# Patient Record
Sex: MALE | Race: WHITE | Employment: FULL TIME | URBAN - METROPOLITAN AREA
[De-identification: names, ages, dates, MRNs, and addresses within clinical notes are randomized per-mention and may not be internally consistent; named-entity substitution may affect disease eponyms.]

---

## 2024-04-25 ENCOUNTER — OFFICE VISIT (OUTPATIENT)
Dept: PRIMARY CARE | Facility: CLINIC | Age: 49
End: 2024-04-25
Payer: COMMERCIAL

## 2024-04-25 DIAGNOSIS — E55.9 VITAMIN D DEFICIENCY: ICD-10-CM

## 2024-04-25 DIAGNOSIS — M62.89 MUSCLE FATIGUE: ICD-10-CM

## 2024-04-25 DIAGNOSIS — Z00.00 HEALTHCARE MAINTENANCE: Primary | ICD-10-CM

## 2024-04-25 DIAGNOSIS — E53.8 VITAMIN B12 DEFICIENCY: ICD-10-CM

## 2024-04-25 DIAGNOSIS — Z12.5 PROSTATE CANCER SCREENING: ICD-10-CM

## 2024-04-25 PROCEDURE — 99396 PREV VISIT EST AGE 40-64: CPT | Performed by: STUDENT IN AN ORGANIZED HEALTH CARE EDUCATION/TRAINING PROGRAM

## 2024-04-25 PROCEDURE — 99213 OFFICE O/P EST LOW 20 MIN: CPT | Performed by: STUDENT IN AN ORGANIZED HEALTH CARE EDUCATION/TRAINING PROGRAM

## 2024-04-25 NOTE — PROGRESS NOTES
Subjective   Sony Ladd is a 48 y.o. male who is here for a routine exam.  Active Problem List      Comprehensive Medical/Surgical/Social/Family History  History reviewed. No pertinent past medical history.  Past Surgical History:   Procedure Laterality Date    OTHER SURGICAL HISTORY  12/14/2020    Appendectomy    OTHER SURGICAL HISTORY  12/14/2020    Hernia repair    OTHER SURGICAL HISTORY  12/14/2020    Tonsillectomy    OTHER SURGICAL HISTORY  12/14/2020    Sinus surgery    OTHER SURGICAL HISTORY  12/14/2020    [place] tooth extraction     Social History     Social History Narrative    Not on file       Allergies and Medications  Patient has no allergy information on record.  No current outpatient medications on file prior to visit.     No current facility-administered medications on file prior to visit.       Concerns today:  Patient seen today for exercise fatigue.  He is very physically active, feels he cannot do as much as he previously could.  Exercises regularly patient was in the  previously diagnosed PTSD.  Was previously on Zoloft but caused restless leg syndrome.  States he regularly got restless leg syndrome with medications not like to take too many medications.  Does take vitamin supplementation regularly.  Is interested in getting lab work today concern for testosterone levels.  Has decreased sexual interest but no history of with sexual activity.  Currently single living by himself.  Patient sleeps 3 to 5 hours per night states he had previously slept better, all would be interested in getting  Help with sleep as well.      Lifestyle  Diet: Healthy and Well Balanced  Physical Activity: Not on file      Tobacco Use: Low Risk  (10/4/2023)    Received from [facility]    Patient History     Smoking Tobacco Use: Never     Smokeless Tobacco Use: Never     Passive Exposure: Not on file      Alcohol Use: Not on file      Depression: Not at risk (4/18/2024)    Received from [facility]     PHQ-2     PHQ-2 score: 0      Stress: Not on file       Consultants:  None        Did not this because colorectal screening at this time    Nocturia: .Absent  Erectile dysfunction: .Absent    Review of Systems    Objective   There were no vitals taken for this visit.    Physical Exam    Assessment/Plan   Problem List Items Addressed This Visit    None  Visit Diagnoses       Healthcare maintenance    -  Primary    Relevant Orders    Comprehensive Metabolic Panel    Lipid Panel    CBC    Vitamin B12 deficiency        Relevant Orders    Vitamin B12    Vitamin D deficiency        Relevant Orders    Vitamin D 25-Hydroxy,Total (for eval of Vitamin D levels)    Muscle fatigue        Relevant Orders    TSH with reflex to Free T4 if abnormal    Testosterone    Prostate cancer screening        Relevant Orders    Prostate Spec.Ag,Screen          Reviewed Social Determinants of health with patient, discussed healthy lifestyle including 150 minutes of physical activity per week  Ordered/Reviewed baseline labwork -CBC, CMP, Lipid Panel  Will get routine lab work, will follow-up with results.    For patient's sleep discussed trying a magnesium glycinate supplementation.    If that does not work we will trial trazodone.    Will check lab work including TSH, testosterone, vitamin D, vitamin B12 as well.

## 2024-07-01 ENCOUNTER — HOSPITAL ENCOUNTER (OUTPATIENT)
Dept: RADIOLOGY | Facility: CLINIC | Age: 49
Discharge: HOME | End: 2024-07-01
Payer: COMMERCIAL

## 2024-07-01 ENCOUNTER — OFFICE VISIT (OUTPATIENT)
Dept: PRIMARY CARE | Facility: CLINIC | Age: 49
End: 2024-07-01
Payer: COMMERCIAL

## 2024-07-01 VITALS
RESPIRATION RATE: 20 BRPM | SYSTOLIC BLOOD PRESSURE: 133 MMHG | OXYGEN SATURATION: 98 % | DIASTOLIC BLOOD PRESSURE: 78 MMHG | HEART RATE: 95 BPM | BODY MASS INDEX: 29.05 KG/M2 | TEMPERATURE: 98 F | WEIGHT: 180 LBS

## 2024-07-01 DIAGNOSIS — M54.50 LUMBAR BACK PAIN: ICD-10-CM

## 2024-07-01 DIAGNOSIS — M54.16 LUMBAR RADICULOPATHY: ICD-10-CM

## 2024-07-01 DIAGNOSIS — M54.50 LUMBAR BACK PAIN: Primary | ICD-10-CM

## 2024-07-01 PROCEDURE — 72100 X-RAY EXAM L-S SPINE 2/3 VWS: CPT

## 2024-07-01 PROCEDURE — 99214 OFFICE O/P EST MOD 30 MIN: CPT

## 2024-07-01 PROCEDURE — 1036F TOBACCO NON-USER: CPT

## 2024-07-01 PROCEDURE — 72100 X-RAY EXAM L-S SPINE 2/3 VWS: CPT | Performed by: RADIOLOGY

## 2024-07-01 RX ORDER — CYCLOBENZAPRINE HCL 10 MG
10 TABLET ORAL NIGHTLY PRN
Qty: 30 TABLET | Refills: 2 | Status: SHIPPED | OUTPATIENT
Start: 2024-07-01 | End: 2025-02-26

## 2024-07-01 RX ORDER — PREDNISONE 20 MG/1
40 TABLET ORAL DAILY
Qty: 10 TABLET | Refills: 0 | Status: SHIPPED | OUTPATIENT
Start: 2024-07-01 | End: 2024-07-06

## 2024-07-01 ASSESSMENT — ENCOUNTER SYMPTOMS
BACK PAIN: 1
ACTIVITY CHANGE: 0
APPETITE CHANGE: 0
FEVER: 0
CHILLS: 0

## 2024-07-01 NOTE — PROGRESS NOTES
I reviewed the resident/fellow's documentation and discussed the patient with the resident. I agree with the resident medical decision making as documented in the note.   Patient denies any right-sided symptoms to the resident such as saddle anesthesia, no bowel bladder dysfunction no numbness tingling, no weakness.  She is very tight tender.  He seen orthopedics in the past.  Will try medication stretches exercises.  Will recheck his x-ray.  Patient may need an MRI, referral to back specialist  If any chest pain, shortness of breath, any numbness tingling bowel bladder dysfunction any saddle anesthesia any weakness or worsening symptoms go to ER  Follow-up in 2 weeks  Side effects of medication were explained to the patient by the resident.  He has tolerated these in the past  Bryan David, DO

## 2024-07-01 NOTE — PROGRESS NOTES
"Subjective   Patient ID: [id number]   Sony Ladd is a 48 y.o. male who presents for Back Pain (Lower back pain after weeding yesterday. Pain is shooting down his left leg. ).  Patient states that he experienced left-sided lumbar back pain yesterday evening after weeding.  He denies any specific injury.  He describes pain as 2/10 at rest, with radiation down the left leg to the level of the knee.  He has tried ice/heat and Aleve with minimal improvement.  He states that he got very little sleep last night secondary to pain.  Pt denies any red flag symptoms such as fever, saddle anesthesia, loss of bowel/bladder control.  He describes previous imaging that revealed a \"vertebral fracture\" last year.           Review of Systems   Constitutional:  Negative for activity change, appetite change, chills and fever.   Musculoskeletal:  Positive for back pain.       Objective   /78 (BP Location: Right arm, Patient Position: Sitting)   Pulse 95   Temp 36.7 °C (98 °F)   Resp 20   Wt 81.6 kg (180 lb)   SpO2 98%   BMI 29.05 kg/m²    Physical Exam  Vitals and nursing note reviewed.   Constitutional:       General: He is not in acute distress.     Appearance: Normal appearance. He is not ill-appearing or toxic-appearing.   HENT:      Head: Normocephalic and atraumatic.      Right Ear: External ear normal.      Left Ear: External ear normal.      Nose: Nose normal.      Mouth/Throat:      Mouth: Mucous membranes are moist.   Eyes:      Extraocular Movements: Extraocular movements intact.   Cardiovascular:      Rate and Rhythm: Normal rate and regular rhythm.   Pulmonary:      Effort: Pulmonary effort is normal. No respiratory distress.      Breath sounds: Normal breath sounds. No stridor. No wheezing or rhonchi.   Abdominal:      General: Abdomen is flat.      Tenderness: There is no abdominal tenderness. There is no guarding or rebound.   Musculoskeletal:         General: Normal range of motion.        " Arms:       Cervical back: Normal.      Thoracic back: Normal.      Comments: Tenderness on midline at L5/S1  Spasm left paraspinal lumbar muscles  Positive left straight leg raise, reverse straight leg raise  Negative slump test   Skin:     General: Skin is warm and dry.      Findings: No rash.   Neurological:      General: No focal deficit present.      Mental Status: He is alert and oriented to person, place, and time.         Assessment/Plan   Problem List Items Addressed This Visit    None  Visit Diagnoses       Lumbar back pain    -  Primary    Relevant Medications    cyclobenzaprine (Flexeril) 10 mg tablet    predniSONE (Deltasone) 20 mg tablet    Other Relevant Orders    XR lumbar spine 2-3 views    Lumbar radiculopathy        Relevant Medications    cyclobenzaprine (Flexeril) 10 mg tablet    predniSONE (Deltasone) 20 mg tablet    Other Relevant Orders    XR lumbar spine 2-3 views             X-rays from 2022 at [facility] reveal bilateral pars defects at L5 with grade 1   anterolisthesis of L5-S1.  Will check lumbar x-rays to see if this is stable.  Otherwise plan as above, [place] back protocol provided.  Follow-up in 5 to 7 days if not improving.     Discussed with attending physician Dr. David.       Bhavin Nunez DO

## 2024-07-03 NOTE — RESULT ENCOUNTER NOTE
Patient's x-ray shows moderate anterolisthesis.  We should send him to orthopedics and physical therapy.  They did not mention the actual measurement

## 2024-07-08 ENCOUNTER — TELEPHONE (OUTPATIENT)
Dept: PRIMARY CARE | Facility: CLINIC | Age: 49
End: 2024-07-08
Payer: COMMERCIAL

## 2024-07-08 DIAGNOSIS — M43.16 ANTEROLISTHESIS OF LUMBAR SPINE: Primary | ICD-10-CM

## 2024-07-11 ENCOUNTER — OFFICE VISIT (OUTPATIENT)
Dept: NEUROSURGERY | Facility: CLINIC | Age: 49
End: 2024-07-11
Payer: COMMERCIAL

## 2024-07-11 VITALS
WEIGHT: 175.8 LBS | BODY MASS INDEX: 28.25 KG/M2 | TEMPERATURE: 97.7 F | HEART RATE: 100 BPM | HEIGHT: 66 IN | SYSTOLIC BLOOD PRESSURE: 128 MMHG | DIASTOLIC BLOOD PRESSURE: 92 MMHG

## 2024-07-11 DIAGNOSIS — M43.06 LUMBAR SPONDYLOLYSIS: ICD-10-CM

## 2024-07-11 DIAGNOSIS — M54.16 LUMBAR RADICULOPATHY: Primary | ICD-10-CM

## 2024-07-11 DIAGNOSIS — M43.17 ANTEROLISTHESIS OF LUMBOSACRAL SPINE: ICD-10-CM

## 2024-07-11 PROCEDURE — 1036F TOBACCO NON-USER: CPT | Performed by: NURSE PRACTITIONER

## 2024-07-11 PROCEDURE — 99203 OFFICE O/P NEW LOW 30 MIN: CPT | Performed by: NURSE PRACTITIONER

## 2024-07-11 RX ORDER — GABAPENTIN 100 MG/1
CAPSULE ORAL
Qty: 90 CAPSULE | Refills: 0 | Status: SHIPPED | OUTPATIENT
Start: 2024-07-11

## 2024-07-11 RX ORDER — NAPROXEN 250 MG/1
250 TABLET ORAL
COMMUNITY

## 2024-07-11 ASSESSMENT — PATIENT HEALTH QUESTIONNAIRE - PHQ9
1. LITTLE INTEREST OR PLEASURE IN DOING THINGS: NOT AT ALL
SUM OF ALL RESPONSES TO PHQ9 QUESTIONS 1 AND 2: 0
2. FEELING DOWN, DEPRESSED OR HOPELESS: NOT AT ALL

## 2024-07-11 ASSESSMENT — ENCOUNTER SYMPTOMS: OCCASIONAL FEELINGS OF UNSTEADINESS: 0

## 2024-07-11 ASSESSMENT — PAIN SCALES - GENERAL: PAINLEVEL: 6

## 2024-07-11 NOTE — PROGRESS NOTES
"It was a pleasure to see Sony Ladd on 7/11/2024. He is a 48 y.o. year old, right-handed male who presents to the [facility] for evaluation of back pain, lumbar radiculopathy. Patient is referred by No ref. provider found. PMH is significant for migraines, PTSD, renal stones, chronic sinusitis, known pars defect over the past 6 months. [place] and is often up on ladders.     Sony Ladd has had symptoms of back pain for many years. Current episode (points to low back radiating into left lateral and anterior thigh that he describes as painful, burning) has been present x 2 weeks since weeding in his garden. Progression of symptoms prompted lumbar spine x-ray which demonstrated L5 - S1 pars defect / spondylolysis with anterolisthesis.  Thus far, patient has tried activity adjustment,  with little to no improvement of symptoms. He denies change in bowel / bladder function, saddle anesthesia.     PREVIOUS TREATMENTS  NSAIDs  Muscle relaxant   Topical    Previous Spine Surgery: No     Smoker: No   Anticoagulation / Antiplatelets: YES: NSAID use     ROS: 12 / 12 systems reviewed and are negative unless noted in HPI    BP (!) 128/92   Pulse 100   Temp 36.5 °C (97.7 °F) (Temporal)   Ht 1.676 m (5' 6\")   Wt 79.7 kg (175 lb 12.8 oz)   BMI 28.37 kg/m²     ON EXAM:  General: Well developed male, awake/alert/oriented x 3, no distress, alert and cooperative  Skin: Warm and dry, no visible lesions / rashes  ENMT: Mucous membranes moist, no apparent injury  Head/Neck: No apparent injury  Respiratory/Thorax: Normal breathing with good chest expansion, thorax symmetric  Cardiovascular: No pitting edema, no JVD  Gastrointestinal: Non-distended  NEUROLOGICAL EXAM:  EOMI, face symmetric  Motor Strength: 5/5 in BLE  Range of Motion:   Normal in all extremities  Muscle Tone: Normal without spasticity or contractures in all extremities  Muscle Bulk: Normal and " symmetric in all extremities  Posture: Stands  in flexed forward position  -- Cervical: Normal  -- Thoracic: Normal  -- Lumbar : Normal  Sensation: DECREASED SENSORY L2 - 3 DERMATOMES, otherwise, SILT in BLE  Gait: Normal, toe / heel gait are slow and exacerbate dermatomal symptoms  Deep Tendon Reflexes: 2+ RLE, 1+ LLE      Sony Ladd has left lumbar radiculopathy and L5 - S1 anterolisthesis, spondylolysis. We reviewed lumbar AP/LAT images done 07/08/2024, and discussed rationale for dynamic lumbar imaging in the setting of spondylolysis / spondylolisthesis, for Physical Therapy for Home Exercise Program. Encouraged follow up in 6 - 8 weeks for reassessment. If not improved, will consider MRI L Spine.  He verbalizes understanding and agreement with plan.  Kate Pepe, HERI-CNP

## 2024-07-23 ENCOUNTER — LAB (OUTPATIENT)
Dept: LAB | Facility: LAB | Age: 49
End: 2024-07-23
Payer: COMMERCIAL

## 2024-07-23 DIAGNOSIS — Z12.5 PROSTATE CANCER SCREENING: ICD-10-CM

## 2024-07-23 DIAGNOSIS — E53.8 VITAMIN B12 DEFICIENCY: ICD-10-CM

## 2024-07-23 DIAGNOSIS — M62.89 MUSCLE FATIGUE: ICD-10-CM

## 2024-07-23 DIAGNOSIS — E55.9 VITAMIN D DEFICIENCY: ICD-10-CM

## 2024-07-23 DIAGNOSIS — Z00.00 HEALTHCARE MAINTENANCE: ICD-10-CM

## 2024-07-23 LAB
25(OH)D3 SERPL-MCNC: 28 NG/ML (ref 30–100)
ALBUMIN SERPL BCP-MCNC: 4.5 G/DL (ref 3.4–5)
ALP SERPL-CCNC: 67 U/L (ref 33–120)
ALT SERPL W P-5'-P-CCNC: 26 U/L (ref 10–52)
ANION GAP SERPL CALC-SCNC: 12 MMOL/L (ref 10–20)
AST SERPL W P-5'-P-CCNC: 24 U/L (ref 9–39)
BILIRUB SERPL-MCNC: 0.7 MG/DL (ref 0–1.2)
BUN SERPL-MCNC: 17 MG/DL (ref 6–23)
CALCIUM SERPL-MCNC: 9 MG/DL (ref 8.6–10.3)
CHLORIDE SERPL-SCNC: 102 MMOL/L (ref 98–107)
CHOLEST SERPL-MCNC: 242 MG/DL (ref 0–199)
CHOLESTEROL/HDL RATIO: 4.8
CO2 SERPL-SCNC: 27 MMOL/L (ref 21–32)
CREAT SERPL-MCNC: 1.19 MG/DL (ref 0.5–1.3)
EGFRCR SERPLBLD CKD-EPI 2021: 74 ML/MIN/1.73M*2
ERYTHROCYTE [DISTWIDTH] IN BLOOD BY AUTOMATED COUNT: 12.4 % (ref 11.5–14.5)
GLUCOSE SERPL-MCNC: 88 MG/DL (ref 74–99)
HCT VFR BLD AUTO: 48.1 % (ref 41–52)
HDLC SERPL-MCNC: 50.4 MG/DL
HGB BLD-MCNC: 15.8 G/DL (ref 13.5–17.5)
LDLC SERPL CALC-MCNC: 157 MG/DL
MCH RBC QN AUTO: 29.8 PG (ref 26–34)
MCHC RBC AUTO-ENTMCNC: 32.8 G/DL (ref 32–36)
MCV RBC AUTO: 91 FL (ref 80–100)
NON HDL CHOLESTEROL: 192 MG/DL (ref 0–149)
NRBC BLD-RTO: 0 /100 WBCS (ref 0–0)
PLATELET # BLD AUTO: 262 X10*3/UL (ref 150–450)
POTASSIUM SERPL-SCNC: 4.2 MMOL/L (ref 3.5–5.3)
PROT SERPL-MCNC: 6.8 G/DL (ref 6.4–8.2)
PSA SERPL-MCNC: 0.73 NG/ML
RBC # BLD AUTO: 5.3 X10*6/UL (ref 4.5–5.9)
SODIUM SERPL-SCNC: 137 MMOL/L (ref 136–145)
TRIGL SERPL-MCNC: 173 MG/DL (ref 0–149)
TSH SERPL-ACNC: 1.83 MIU/L (ref 0.44–3.98)
VIT B12 SERPL-MCNC: 155 PG/ML (ref 211–911)
VLDL: 35 MG/DL (ref 0–40)
WBC # BLD AUTO: 7.5 X10*3/UL (ref 4.4–11.3)

## 2024-07-23 PROCEDURE — 84403 ASSAY OF TOTAL TESTOSTERONE: CPT

## 2024-07-23 PROCEDURE — 84443 ASSAY THYROID STIM HORMONE: CPT

## 2024-07-23 PROCEDURE — 36415 COLL VENOUS BLD VENIPUNCTURE: CPT

## 2024-07-23 PROCEDURE — 82607 VITAMIN B-12: CPT

## 2024-07-23 PROCEDURE — 82306 VITAMIN D 25 HYDROXY: CPT

## 2024-07-23 PROCEDURE — 80061 LIPID PANEL: CPT

## 2024-07-23 PROCEDURE — 84153 ASSAY OF PSA TOTAL: CPT

## 2024-07-23 PROCEDURE — 85027 COMPLETE CBC AUTOMATED: CPT

## 2024-07-23 PROCEDURE — 80053 COMPREHEN METABOLIC PANEL: CPT

## 2024-07-24 LAB — TESTOST SERPL-MCNC: 603 NG/DL (ref 240–1000)

## 2024-08-21 ENCOUNTER — TELEPHONE (OUTPATIENT)
Dept: NEUROSURGERY | Facility: CLINIC | Age: 49
End: 2024-08-21
Payer: COMMERCIAL

## 2024-08-21 ENCOUNTER — EVALUATION (OUTPATIENT)
Dept: PHYSICAL THERAPY | Facility: CLINIC | Age: 49
End: 2024-08-21
Payer: COMMERCIAL

## 2024-08-21 DIAGNOSIS — M54.16 LUMBAR RADICULOPATHY: ICD-10-CM

## 2024-08-21 DIAGNOSIS — M43.06 LUMBAR SPONDYLOLYSIS: ICD-10-CM

## 2024-08-21 DIAGNOSIS — M43.17 ANTEROLISTHESIS OF LUMBOSACRAL SPINE: ICD-10-CM

## 2024-08-21 PROCEDURE — 97110 THERAPEUTIC EXERCISES: CPT | Mod: GP | Performed by: PHYSICAL THERAPIST

## 2024-08-21 PROCEDURE — 97161 PT EVAL LOW COMPLEX 20 MIN: CPT | Mod: GP | Performed by: PHYSICAL THERAPIST

## 2024-08-21 ASSESSMENT — PAIN DESCRIPTION - DESCRIPTORS: DESCRIPTORS: ACHING;TIGHTNESS

## 2024-08-21 ASSESSMENT — PAIN - FUNCTIONAL ASSESSMENT: PAIN_FUNCTIONAL_ASSESSMENT: 0-10

## 2024-08-21 ASSESSMENT — PAIN SCALES - GENERAL: PAINLEVEL_OUTOF10: 2

## 2024-08-21 NOTE — PROGRESS NOTES
Physical Therapy Evaluation and Treatment      Patient Name: Sony Ladd  MRN: [id number]  Today's Date: 8/21/2024    Time Entry:   Time Calculation  Start Time: 1440  Stop Time: 1523  Time Calculation (min): 43 min  PT Evaluation Time Entry  PT Evaluation (Low) Time Entry: 20  PT Therapeutic Procedures Time Entry  Therapeutic Exercise Time Entry: 20                   Insurance:  Visit:  1 of 9 (PT/OT/ST combined)  Auth required: No  Payor: ANTHEM / Plan: ANTHEM HMP / Product Type: *No Product type* /     Assessment:  PT Assessment  Rehab Prognosis: Good  Evaluation/Treatment Tolerance: Patient tolerated treatment well   Patient presents to physical therapy with c/o LBP with intermittent numbness into LLE.  Long hx of Lx issues dating back to his 12yrs in the [employer].  Has confirmed par defect in Lx.  States he was doing yard work back in July when he stood up and had instant pain into back and LLE.  States he had burning in L thigh which eventually turned to numbness.  Was able to see PCP and had XR which shows B spondylolysis at L5 with moderate anterolithesis of L5 on S1.  Was in to see spine clinic on 7/11/24 at which time he was told he would need an MRI but would need to complete PT before getting updated imaging.  Sx have since calmed down.  Exhibits decreased core and glute strength, TTP L L3-4, 4-5 UPA, and pain with Lx extension.  S/s consistent with referring diagnosis of spondylolisthesis.  Given extremely limited PT visits for calendar year and spine clinic's recommendation for MRI, I have concerns about appropriateness of moving forward with repeated visits at this time.  If patient should need alternate intervention based on MRI findings, he would likely need therapy after and would have no more visits to use at that time.  My opinion is to move forward with MRI at this time before exhausting his remaining therapy visits for the year.      Plan:  OP PT Plan  PT Plan: Skilled PT  PT Frequency: 1  time per week  Duration: 12wks  Onset Date: 07/01/24  Rehab Potential: Good  Plan of Care Agreement: Patient    Current Problem:   1. Lumbar spondylolysis  Referral to Physical Therapy    Follow Up In Physical Therapy      2. Anterolisthesis of lumbosacral spine  Referral to Physical Therapy    Follow Up In Physical Therapy      3. Lumbar radiculopathy  Referral to Physical Therapy    Follow Up In Physical Therapy          Subjective    Patient presents to physical therapy for evaluation of Lx.  Long hx of Lx issues dating back to his 12yrs in the [employer].  Has confirmed par defect in Lx.  States he was doing yard work back in July when he stood up and had instant pain into back and LLE.  States he had burning in L thigh which eventually turned to numbness.  Was able to see PCP and had XR which shows B spondylolysis at L5 with moderate anterolithesis of L5 on S1.  Was in to see spine clinic on 7/11/24 at which time he was told he would need an MRI but would need to complete PT before getting updated imaging.  Sx have since calmed down.  C/o LBP with intermittent numbness into LLE.    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)    HOBBIES:  Working out    HOME SETUP:  Single story with basement    BARRIERS IMPACTING CARE:  Chronicity of issues  Limitations regarding insurance visits    General:  General  Reason for Referral: Lx radiculopathy  Referred By: Kate Pepe CNP  Precautions:  Precautions  STEADI Fall Risk Score (The score of 4 or more indicates an increased risk of falling): 0  Medical Precautions: No known precautions/limitation (Medical hx reviewed; not likely to impact care.)  Pain:  Pain Assessment  Pain Assessment: 0-10  0-10 (Numeric) Pain Score: 2 (7/10 at worst)  Pain Type: Chronic pain  Pain  Location: Back  Pain Orientation: Lower  Pain Radiating Towards: LLE  Pain Descriptors: Aching, Tightness  Pain Frequency: Intermittent  Prior Level of Function:  Prior Function Per Pt/Caregiver Report  Level of [place]: Independent with ADLs and functional transfers    Objective   AROM  Lx ROM WNL; pain with end range extension    STRENGTH  R hip flexion 5/5  R hip extension 4-/5  R hip ABD 5/5  R hip ER 4+/5  R hip IR 5/5    L hip flexion 5/5  L hip extension 3+/5  L hip ABD 4+/5  L hip ER 4-/5  L hip IR 4/5    TA activation: moderate palpation    PALPATION  TTP L glute med  TTP L L3-4, 4-5 UPA    Outcome Measures:  Other Measures  Oswestry Disablity Index (TOÑO): 16%     TREATMENT  THERAPEUTIC EXERCISE:  Access Code: [email or web address]  URL: [email or web address]  Date: 08/21/2024  Prepared by: Danii Ghosh    Exercises  - Supine Bridge  - 1 x daily - 7 x weekly - 3 sets - 10 reps  - Sidelying Hip Abduction  - 1 x daily - 7 x weekly - 3 sets - 10 reps  - Clamshell  - 1 x daily - 7 x weekly - 3 sets - 10 reps    EDUCATION:   Patient education on diagnosis/prognosis, pathophysiology, POC, and HEP.  Patient demonstrates understanding of HEP/POC.    Goals:  1. Pain 0/10  2. Outcomes Measure:  TOÑO 4%  3. Independent activation of TA with all functional mobility to allow the patient to lift/carry >40# without increased sx.  4. BLE strength 5/5 to allow patient to ambulate >1hr without increased sx.  5. Demonstrates independence with HEP.

## 2024-08-23 ENCOUNTER — APPOINTMENT (OUTPATIENT)
Dept: NEUROSURGERY | Facility: CLINIC | Age: 49
End: 2024-08-23
Payer: COMMERCIAL

## 2024-08-28 ENCOUNTER — APPOINTMENT (OUTPATIENT)
Dept: PHYSICAL THERAPY | Facility: CLINIC | Age: 49
End: 2024-08-28
Payer: COMMERCIAL

## 2024-09-03 ENCOUNTER — APPOINTMENT (OUTPATIENT)
Dept: PHYSICAL THERAPY | Facility: CLINIC | Age: 49
End: 2024-09-03
Payer: COMMERCIAL

## 2024-09-18 ENCOUNTER — APPOINTMENT (OUTPATIENT)
Dept: PHYSICAL THERAPY | Facility: CLINIC | Age: 49
End: 2024-09-18
Payer: COMMERCIAL

## 2024-09-25 ENCOUNTER — APPOINTMENT (OUTPATIENT)
Dept: PHYSICAL THERAPY | Facility: CLINIC | Age: 49
End: 2024-09-25
Payer: COMMERCIAL

## 2024-10-02 ENCOUNTER — APPOINTMENT (OUTPATIENT)
Dept: PHYSICAL THERAPY | Facility: CLINIC | Age: 49
End: 2024-10-02
Payer: COMMERCIAL